# Patient Record
Sex: MALE | Race: WHITE | Employment: UNEMPLOYED | URBAN - METROPOLITAN AREA
[De-identification: names, ages, dates, MRNs, and addresses within clinical notes are randomized per-mention and may not be internally consistent; named-entity substitution may affect disease eponyms.]

---

## 2017-02-01 ENCOUNTER — OFFICE VISIT (OUTPATIENT)
Dept: INTERNAL MEDICINE CLINIC | Facility: CLINIC | Age: 27
End: 2017-02-01

## 2017-02-01 VITALS
SYSTOLIC BLOOD PRESSURE: 146 MMHG | OXYGEN SATURATION: 97 % | HEIGHT: 71 IN | DIASTOLIC BLOOD PRESSURE: 88 MMHG | HEART RATE: 108 BPM | BODY MASS INDEX: 44.1 KG/M2 | TEMPERATURE: 98 F | WEIGHT: 315 LBS | RESPIRATION RATE: 20 BRPM

## 2017-02-01 DIAGNOSIS — E66.01 MORBID OBESITY DUE TO EXCESS CALORIES (HCC): ICD-10-CM

## 2017-02-01 DIAGNOSIS — G83.21: Primary | ICD-10-CM

## 2017-02-01 DIAGNOSIS — G47.30 SLEEP-DISORDERED BREATHING: ICD-10-CM

## 2017-02-01 DIAGNOSIS — F25.0 SCHIZOAFFECTIVE DISORDER, BIPOLAR TYPE (HCC): ICD-10-CM

## 2017-02-01 DIAGNOSIS — G83.22: Primary | ICD-10-CM

## 2017-02-01 PROCEDURE — 99212 OFFICE O/P EST SF 10 MIN: CPT | Performed by: INTERNAL MEDICINE

## 2017-02-01 PROCEDURE — 99213 OFFICE O/P EST LOW 20 MIN: CPT | Performed by: INTERNAL MEDICINE

## 2017-02-01 RX ORDER — MELOXICAM 15 MG/1
TABLET ORAL
Refills: 0 | COMMUNITY
Start: 2017-01-10 | End: 2017-02-01 | Stop reason: ALTCHOICE

## 2017-02-01 NOTE — PROGRESS NOTES
Continues to c/o coughing asociated with episodes of paralysis  Cant fill his lungs up  His geodon dose was increased  Wt Readings from Last 6 Encounters:  02/01/17 : 394 lb 9.6 oz (178.989 kg)  11/04/16 : 368 lb 6.4 oz (167.105 kg)  04/14/16 : 351 lb (159

## 2017-02-06 ENCOUNTER — LAB ENCOUNTER (OUTPATIENT)
Dept: LAB | Age: 27
End: 2017-02-06
Attending: INTERNAL MEDICINE
Payer: COMMERCIAL

## 2017-02-06 DIAGNOSIS — G83.21: ICD-10-CM

## 2017-02-06 DIAGNOSIS — G47.30 SLEEP-DISORDERED BREATHING: ICD-10-CM

## 2017-02-06 DIAGNOSIS — G83.22: ICD-10-CM

## 2017-02-06 LAB
ALBUMIN SERPL BCP-MCNC: 4.2 G/DL (ref 3.5–4.8)
ALBUMIN/GLOB SERPL: 1.4 {RATIO} (ref 1–2)
ALP SERPL-CCNC: 69 U/L (ref 32–100)
ALT SERPL-CCNC: 27 U/L (ref 17–63)
ANION GAP SERPL CALC-SCNC: 11 MMOL/L (ref 0–18)
AST SERPL-CCNC: 23 U/L (ref 15–41)
BASOPHILS # BLD: 0.1 K/UL (ref 0–0.2)
BASOPHILS NFR BLD: 1 %
BILIRUB SERPL-MCNC: 0.9 MG/DL (ref 0.3–1.2)
BUN SERPL-MCNC: 6 MG/DL (ref 8–20)
BUN/CREAT SERPL: 6.7 (ref 10–20)
CALCIUM SERPL-MCNC: 9.6 MG/DL (ref 8.5–10.5)
CHLORIDE SERPL-SCNC: 100 MMOL/L (ref 95–110)
CO2 SERPL-SCNC: 27 MMOL/L (ref 22–32)
CREAT SERPL-MCNC: 0.89 MG/DL (ref 0.5–1.5)
EOSINOPHIL # BLD: 0.5 K/UL (ref 0–0.7)
EOSINOPHIL NFR BLD: 5 %
ERYTHROCYTE [DISTWIDTH] IN BLOOD BY AUTOMATED COUNT: 13.3 % (ref 11–15)
GLOBULIN PLAS-MCNC: 3.1 G/DL (ref 2.5–3.7)
GLUCOSE SERPL-MCNC: 91 MG/DL (ref 70–99)
HCT VFR BLD AUTO: 46.5 % (ref 41–52)
HGB BLD-MCNC: 15.5 G/DL (ref 13.5–17.5)
LYMPHOCYTES # BLD: 2.4 K/UL (ref 1–4)
LYMPHOCYTES NFR BLD: 26 %
MCH RBC QN AUTO: 29.8 PG (ref 27–32)
MCHC RBC AUTO-ENTMCNC: 33.3 G/DL (ref 32–37)
MCV RBC AUTO: 89.5 FL (ref 80–100)
MONOCYTES # BLD: 0.8 K/UL (ref 0–1)
MONOCYTES NFR BLD: 9 %
NEUTROPHILS # BLD AUTO: 5.4 K/UL (ref 1.8–7.7)
NEUTROPHILS NFR BLD: 60 %
OSMOLALITY UR CALC.SUM OF ELEC: 283 MOSM/KG (ref 275–295)
PLATELET # BLD AUTO: 338 K/UL (ref 140–400)
PMV BLD AUTO: 9.6 FL (ref 7.4–10.3)
POTASSIUM SERPL-SCNC: 4.2 MMOL/L (ref 3.3–5.1)
PROT SERPL-MCNC: 7.3 G/DL (ref 5.9–8.4)
RBC # BLD AUTO: 5.19 M/UL (ref 4.5–5.9)
SODIUM SERPL-SCNC: 138 MMOL/L (ref 136–144)
WBC # BLD AUTO: 9 K/UL (ref 4–11)

## 2017-02-06 PROCEDURE — 80053 COMPREHEN METABOLIC PANEL: CPT

## 2017-02-06 PROCEDURE — 36415 COLL VENOUS BLD VENIPUNCTURE: CPT

## 2017-02-06 PROCEDURE — 85025 COMPLETE CBC W/AUTO DIFF WBC: CPT

## 2017-03-03 ENCOUNTER — OFFICE VISIT (OUTPATIENT)
Dept: NEUROLOGY | Facility: CLINIC | Age: 27
End: 2017-03-03

## 2017-03-03 ENCOUNTER — APPOINTMENT (OUTPATIENT)
Dept: LAB | Facility: HOSPITAL | Age: 27
End: 2017-03-03
Attending: Other
Payer: COMMERCIAL

## 2017-03-03 VITALS
DIASTOLIC BLOOD PRESSURE: 94 MMHG | BODY MASS INDEX: 45.1 KG/M2 | WEIGHT: 315 LBS | HEART RATE: 84 BPM | SYSTOLIC BLOOD PRESSURE: 160 MMHG | HEIGHT: 70 IN

## 2017-03-03 DIAGNOSIS — R13.10 DYSPHAGIA, UNSPECIFIED TYPE: Primary | ICD-10-CM

## 2017-03-03 DIAGNOSIS — R13.10 DYSPHAGIA, UNSPECIFIED TYPE: ICD-10-CM

## 2017-03-03 LAB
CK SERPL-CCNC: 171 U/L (ref 49–397)
ERYTHROCYTE [SEDIMENTATION RATE] IN BLOOD: 7 MM/HR (ref 0–15)
TSH SERPL-ACNC: 0.94 UIU/ML (ref 0.34–5.6)

## 2017-03-03 PROCEDURE — 85652 RBC SED RATE AUTOMATED: CPT | Performed by: OTHER

## 2017-03-03 PROCEDURE — 86038 ANTINUCLEAR ANTIBODIES: CPT

## 2017-03-03 PROCEDURE — 82550 ASSAY OF CK (CPK): CPT

## 2017-03-03 PROCEDURE — 84443 ASSAY THYROID STIM HORMONE: CPT

## 2017-03-03 PROCEDURE — 83519 RIA NONANTIBODY: CPT

## 2017-03-03 PROCEDURE — 84165 PROTEIN E-PHORESIS SERUM: CPT

## 2017-03-03 PROCEDURE — 36415 COLL VENOUS BLD VENIPUNCTURE: CPT

## 2017-03-03 PROCEDURE — 99244 OFF/OP CNSLTJ NEW/EST MOD 40: CPT | Performed by: OTHER

## 2017-03-03 RX ORDER — ACETAMINOPHEN 500 MG
500 TABLET ORAL EVERY 6 HOURS PRN
COMMUNITY
End: 2017-07-19

## 2017-03-03 RX ORDER — ZOLPIDEM TARTRATE 10 MG/1
TABLET ORAL
Refills: 0 | COMMUNITY
Start: 2017-02-09 | End: 2017-07-19

## 2017-03-03 RX ORDER — MELOXICAM 15 MG/1
TABLET ORAL
Refills: 0 | COMMUNITY
Start: 2017-02-07 | End: 2017-03-03

## 2017-03-03 NOTE — PROGRESS NOTES
Hussein Thomas : 8/3/1990     HPI:   Patient presents with:  Neurologic Problem: New patient, referred by Dr. Laurent Harley. Patient reports that last summer he started having episodes of trouble swallowing liquids,and choking.  Also lower legs become very visit.    Past Medical History   Diagnosis Date   • Pleurisy twice in 2013     left lung   • Schizo affective schizophrenia ([facility]    • Anxiety state, unspecified      severre anxiety   • Esophageal reflux    • Suicide and self-inflicted poisoning by drug or schizophrenia  NEURO: As in HPI    EXAM:     Vitals:  /94 mmHg  Pulse 84  Ht 70\"  Wt 398 lb  BMI 57.11 kg/m2 repeat blood pressure is 140/82 in both arms. General appearance: In no distress. His neck is supple without Lhermitte sign.   CV: No  Evid sed rate, GABRIELLA, CPK, TSH, and acetylcholine receptor antibody to rule out muscular or neuromuscular cause. If the blood work is negative, I will move forward with a video swallowing study to try to determine the cause of the dysphagia.   EMG with repetitive

## 2017-03-07 LAB
ALBUMIN SERPL ELPH-MCNC: 4.85 G/DL (ref 3.8–5.8)
ALBUMIN/GLOB SERPL: 1.83 {RATIO} (ref 1–2)
ALPHA1 GLOB SERPL ELPH-MCNC: 0.2 G/DL (ref 0.1–0.3)
ALPHA2 GLOB SERPL ELPH-MCNC: 0.89 G/DL (ref 0.6–1)
ANA SER QL: NEGATIVE
B-GLOBULIN SERPL ELPH-MCNC: 0.94 G/DL (ref 0.7–1.3)
GAMMA GLOB SERPL ELPH-MCNC: 0.62 G/DL (ref 0.5–1.7)
TOTAL PROTEIN (SPECIAL TESTING): 7.5 G/DL (ref 6.5–9.1)

## 2017-03-08 LAB — ACETYLCHOLINE BINDING AB: 0 NMOL/L

## 2017-03-10 ENCOUNTER — TELEPHONE (OUTPATIENT)
Dept: NEUROLOGY | Facility: CLINIC | Age: 27
End: 2017-03-10

## 2017-03-10 DIAGNOSIS — R13.10 DYSPHAGIA, UNSPECIFIED TYPE: Primary | ICD-10-CM

## 2017-03-30 ENCOUNTER — HOSPITAL ENCOUNTER (OUTPATIENT)
Dept: GENERAL RADIOLOGY | Facility: HOSPITAL | Age: 27
Discharge: HOME | End: 2017-03-30
Attending: Other
Payer: COMMERCIAL

## 2017-03-30 DIAGNOSIS — R13.10 DYSPHAGIA, UNSPECIFIED TYPE: ICD-10-CM

## 2017-03-30 PROCEDURE — 74230 X-RAY XM SWLNG FUNCJ C+: CPT

## 2017-03-30 PROCEDURE — 92611 MOTION FLUOROSCOPY/SWALLOW: CPT

## 2017-03-30 NOTE — PROGRESS NOTES
ADULT VIDEOFLUOROSCOPIC SWALLOWING STUDY    Referring Physician: Dr. Vernell Delong    1.  Dysphagia, unspecified type R13.10 XR VIDEO SWALLOW (CPT=74230)     XR VIDEO SWALLOW (GFN=46660)      Date of Service: 3/30/2017   Radiologist: Dr. Ben Barrera disorder [facility])    • DVT (deep venous thrombosis) (HCC)    • Transaminitis    • Hypertension    • Pneumonia    • Hypokalemia        Imaging results: CXR 11/7/16:  Negative chest results.     ASSESSMENT   DYSPHAGIA ASSESSMENT  Test completed in conjunction wi swallow.     Esophageal phase:   Adequate flow of bolus through upper esophagus     Overall Impression: Pt presents with adequate oral phase of swallow and minimal oral dysphagia secondary to delayed transit times on thin liquids causing flash laryngeal pen SLP

## 2017-04-04 ENCOUNTER — TELEPHONE (OUTPATIENT)
Dept: NEUROLOGY | Facility: CLINIC | Age: 27
End: 2017-04-04

## 2017-04-04 NOTE — TELEPHONE ENCOUNTER
Dr. Escobedo Loveless called, who has been seeing him for a long time. Has history of bipolar, with previous delusions and somatic symptoms.

## 2017-04-07 ENCOUNTER — TELEPHONE (OUTPATIENT)
Dept: NEUROLOGY | Facility: CLINIC | Age: 27
End: 2017-04-07

## 2017-05-22 ENCOUNTER — OFFICE VISIT (OUTPATIENT)
Dept: PULMONOLOGY | Facility: CLINIC | Age: 27
End: 2017-05-22

## 2017-05-22 VITALS
SYSTOLIC BLOOD PRESSURE: 140 MMHG | RESPIRATION RATE: 18 BRPM | DIASTOLIC BLOOD PRESSURE: 90 MMHG | HEART RATE: 94 BPM | WEIGHT: 315 LBS | HEIGHT: 71 IN | OXYGEN SATURATION: 96 % | BODY MASS INDEX: 44.1 KG/M2

## 2017-05-22 DIAGNOSIS — R05.9 COUGHING: Primary | ICD-10-CM

## 2017-05-22 PROCEDURE — 99244 OFF/OP CNSLTJ NEW/EST MOD 40: CPT | Performed by: INTERNAL MEDICINE

## 2017-05-22 PROCEDURE — 99212 OFFICE O/P EST SF 10 MIN: CPT | Performed by: INTERNAL MEDICINE

## 2017-05-22 NOTE — PROGRESS NOTES
Dear Aroldo Contreras:           As you know, Florentin Duffy is a 78-year-old male who I am meeting now for the first time to evaluate sleep issues and cough. HISTORY OF PRESENT ILLNESS: The patient has a history of obesity with body mass index of 56.8.   He also has sc normal. Bladder function normal. No depression. No thyroid disease. No rash. Muscles and joints unremarkable. Weight gain 30 pounds in the last year.     PHYSICAL EXAMINATION: Vital signs normal. HEENT examination is unremarkable with pupils equal round an

## 2017-06-15 ENCOUNTER — TELEPHONE (OUTPATIENT)
Dept: GASTROENTEROLOGY | Facility: CLINIC | Age: 27
End: 2017-06-15

## 2017-06-19 NOTE — TELEPHONE ENCOUNTER
Pts mother calling back and an appt. Has been scheduled for Mon. 6/26 with Susan Golden for consult.

## 2017-06-26 ENCOUNTER — OFFICE VISIT (OUTPATIENT)
Dept: GASTROENTEROLOGY | Facility: CLINIC | Age: 27
End: 2017-06-26

## 2017-06-26 ENCOUNTER — LAB ENCOUNTER (OUTPATIENT)
Dept: LAB | Facility: HOSPITAL | Age: 27
End: 2017-06-26
Attending: NURSE PRACTITIONER
Payer: COMMERCIAL

## 2017-06-26 VITALS
HEART RATE: 100 BPM | BODY MASS INDEX: 44.1 KG/M2 | WEIGHT: 315 LBS | HEIGHT: 71 IN | SYSTOLIC BLOOD PRESSURE: 160 MMHG | DIASTOLIC BLOOD PRESSURE: 100 MMHG

## 2017-06-26 DIAGNOSIS — R03.0 ELEVATED BP WITHOUT DIAGNOSIS OF HYPERTENSION: ICD-10-CM

## 2017-06-26 DIAGNOSIS — Z86.010 HISTORY OF COLON POLYPS: ICD-10-CM

## 2017-06-26 DIAGNOSIS — R07.89 CHEST WALL DISCOMFORT: ICD-10-CM

## 2017-06-26 DIAGNOSIS — R19.4 CHANGE IN BOWEL HABITS: ICD-10-CM

## 2017-06-26 DIAGNOSIS — R10.84 GENERALIZED ABDOMINAL PAIN: ICD-10-CM

## 2017-06-26 DIAGNOSIS — R10.84 GENERALIZED ABDOMINAL PAIN: Primary | ICD-10-CM

## 2017-06-26 PROCEDURE — 99212 OFFICE O/P EST SF 10 MIN: CPT | Performed by: NURSE PRACTITIONER

## 2017-06-26 PROCEDURE — 85025 COMPLETE CBC W/AUTO DIFF WBC: CPT

## 2017-06-26 PROCEDURE — 86140 C-REACTIVE PROTEIN: CPT

## 2017-06-26 PROCEDURE — 80048 BASIC METABOLIC PNL TOTAL CA: CPT

## 2017-06-26 PROCEDURE — 85652 RBC SED RATE AUTOMATED: CPT

## 2017-06-26 PROCEDURE — 36415 COLL VENOUS BLD VENIPUNCTURE: CPT

## 2017-06-26 PROCEDURE — 99244 OFF/OP CNSLTJ NEW/EST MOD 40: CPT | Performed by: NURSE PRACTITIONER

## 2017-06-26 PROCEDURE — 80076 HEPATIC FUNCTION PANEL: CPT

## 2017-06-26 NOTE — PATIENT INSTRUCTIONS
1. Complete labs and stool testing  2. Complete CT abdomen/pelvis  3. I will call you with the results  4. Please find out where you had your colonoscopy done and see if you can get the records including the colonoscopy report and pathology reports.   Anika

## 2017-06-26 NOTE — H&P
[address] [facility] [address] Gastroenterology                                                                                                  Clinic History and Physical     Pa resolved. Appetite has decreased due to abdominal discomfort following most meals. Denies unexpected weight loss. Denies shortness of breath. The patient has also noticed \"hardening of the fat on the chest and abdomen. \"  He states when he googled t • Hypertension    • Hypokalemia    • Overdose 01/30/2016    haldol overdose/poisoning   • Pleurisy twice in 2013    left lung   • Pneumonia    • Schizo affective schizophrenia ([facility]    • Suicide and self-inflicted poisoning by drug or medicinal substance Cira  Lithium                     Comment:Patient overdosed on Lithium Summer of 2013 and             was told to never have Lithium again due to             possible renal problems later on.     ROS:   CONSTITUTIONAL:  negative for fevers, rigor evaluation of abdominal pain and change in bowel habits.       No anemia noted on lab work dated 2/6/2017.    1.  Generalized abdominal pain/change in bowel habits/nausea and vomiting/decreased appetite: Patient's acute discomfort along with his change in b Following this, may recommend repeat colonoscopy with MAC in light of findings.       Recommend:  See above      Orders This Visit:    Orders Placed This Encounter      CBC W Differential W Platelet      Hepatic Function Panel (7)      Basic Metabolic Panel

## 2017-06-28 ENCOUNTER — PATIENT MESSAGE (OUTPATIENT)
Dept: GASTROENTEROLOGY | Facility: CLINIC | Age: 27
End: 2017-06-28

## 2017-06-28 ENCOUNTER — HOSPITAL ENCOUNTER (OUTPATIENT)
Dept: CT IMAGING | Facility: HOSPITAL | Age: 27
Discharge: HOME | End: 2017-06-28
Attending: NURSE PRACTITIONER
Payer: COMMERCIAL

## 2017-06-28 DIAGNOSIS — R10.84 GENERALIZED ABDOMINAL PAIN: ICD-10-CM

## 2017-06-28 DIAGNOSIS — R10.84 GENERALIZED ABDOMINAL PAIN: Primary | ICD-10-CM

## 2017-06-28 DIAGNOSIS — R19.4 CHANGE IN BOWEL HABITS: ICD-10-CM

## 2017-06-28 PROCEDURE — 74177 CT ABD & PELVIS W/CONTRAST: CPT | Performed by: NURSE PRACTITIONER

## 2017-06-29 ENCOUNTER — LAB ENCOUNTER (OUTPATIENT)
Dept: LAB | Facility: HOSPITAL | Age: 27
End: 2017-06-29
Attending: NURSE PRACTITIONER
Payer: COMMERCIAL

## 2017-06-29 DIAGNOSIS — R19.4 CHANGE IN BOWEL HABITS: ICD-10-CM

## 2017-06-29 LAB
C DIFF TOX B STL QL: NEGATIVE
CRYPTOSP AG STL QL IA: NEGATIVE
G LAMBLIA AG STL QL IA: NEGATIVE

## 2017-06-29 PROCEDURE — 87427 SHIGA-LIKE TOXIN AG IA: CPT

## 2017-06-29 PROCEDURE — 87493 C DIFF AMPLIFIED PROBE: CPT

## 2017-06-29 PROCEDURE — 87272 CRYPTOSPORIDIUM AG IF: CPT

## 2017-06-29 PROCEDURE — 87045 FECES CULTURE AEROBIC BACT: CPT

## 2017-06-29 PROCEDURE — 87046 STOOL CULTR AEROBIC BACT EA: CPT

## 2017-06-29 PROCEDURE — 87338 HPYLORI STOOL AG IA: CPT

## 2017-06-29 PROCEDURE — 83993 ASSAY FOR CALPROTECTIN FECAL: CPT

## 2017-06-29 PROCEDURE — 87329 GIARDIA AG IA: CPT

## 2017-06-29 NOTE — TELEPHONE ENCOUNTER
Records by Dr Coretta Call from 2013 in [facility].  Printed and placed on Kylie's desk to review

## 2017-06-29 NOTE — TELEPHONE ENCOUNTER
From: Mikie Russell  To: THERESA Velez  Sent: 6/28/2017 6:41 PM CDT  Subject: Other    My colonoscopy was done at [place] the results are in mychart so you should have access to them.

## 2017-06-29 NOTE — TELEPHONE ENCOUNTER
Noted. Will await stool tests results and will give pts results/recommendations for everything at one time.

## 2017-06-30 LAB — HELICOBACTER PYLORI AG, FECAL: POSITIVE

## 2017-07-01 LAB — CALPROTECTIN, FECAL: <16 UG/G

## 2017-07-03 ENCOUNTER — TELEPHONE (OUTPATIENT)
Dept: GASTROENTEROLOGY | Facility: CLINIC | Age: 27
End: 2017-07-03

## 2017-07-03 DIAGNOSIS — Z86.010 HISTORY OF ADENOMATOUS POLYP OF COLON: ICD-10-CM

## 2017-07-03 DIAGNOSIS — A04.8 H. PYLORI INFECTION: Primary | ICD-10-CM

## 2017-07-03 RX ORDER — PANTOPRAZOLE SODIUM 40 MG/1
40 TABLET, DELAYED RELEASE ORAL 2 TIMES DAILY
Qty: 28 TABLET | Refills: 0 | Status: SHIPPED | OUTPATIENT
Start: 2017-07-03 | End: 2017-07-03 | Stop reason: ALTCHOICE

## 2017-07-03 RX ORDER — OMEPRAZOLE 20 MG/1
20 CAPSULE, DELAYED RELEASE ORAL 2 TIMES DAILY
Qty: 20 CAPSULE | Refills: 0 | Status: SHIPPED | OUTPATIENT
Start: 2017-07-03 | End: 2017-07-03 | Stop reason: ALTCHOICE

## 2017-07-03 RX ORDER — CLARITHROMYCIN 500 MG/1
500 TABLET, COATED ORAL 2 TIMES DAILY
Qty: 28 TABLET | Refills: 0 | Status: SHIPPED | OUTPATIENT
Start: 2017-07-03 | End: 2017-07-03 | Stop reason: ALTCHOICE

## 2017-07-03 RX ORDER — PANTOPRAZOLE SODIUM 40 MG/1
40 TABLET, DELAYED RELEASE ORAL 2 TIMES DAILY
Qty: 28 TABLET | Refills: 0 | Status: SHIPPED | OUTPATIENT
Start: 2017-07-03 | End: 2017-07-17

## 2017-07-03 RX ORDER — AMOXICILLIN 500 MG/1
1000 TABLET, FILM COATED ORAL 2 TIMES DAILY
Qty: 56 TABLET | Refills: 0 | Status: SHIPPED | OUTPATIENT
Start: 2017-07-03 | End: 2017-07-03 | Stop reason: ALTCHOICE

## 2017-07-03 RX ORDER — TETRACYCLINE HYDROCHLORIDE 500 MG/1
500 CAPSULE ORAL 4 TIMES DAILY
Qty: 56 CAPSULE | Refills: 0 | Status: SHIPPED | OUTPATIENT
Start: 2017-07-03 | End: 2017-07-03 | Stop reason: ALTCHOICE

## 2017-07-03 RX ORDER — METRONIDAZOLE 250 MG/1
250 TABLET ORAL 4 TIMES DAILY
Qty: 56 TABLET | Refills: 0 | Status: SHIPPED | OUTPATIENT
Start: 2017-07-03 | End: 2017-07-17

## 2017-07-03 RX ORDER — DOXYCYCLINE HYCLATE 100 MG
100 TABLET ORAL 2 TIMES DAILY
Qty: 28 TABLET | Refills: 0 | Status: SHIPPED | OUTPATIENT
Start: 2017-07-03 | End: 2017-07-17

## 2017-07-03 NOTE — TELEPHONE ENCOUNTER
Karlie CARDENAS, please see message below. Thank you    Spoke to [employer] from pharmacy and informed of [employer]. States Pylera is covered by insurance but theres is still an additional cost of $200 for the pt d/t rx being so expensive. Please advise.

## 2017-07-03 NOTE — TELEPHONE ENCOUNTER
Nursing: I canceled the Pylera and Omeprazole in Epic and resent the Quad therapy as individual Rx. Please let the pharmacy and pt know. Also, please make sure the pt knows he cannot drink ETOH due to abx.

## 2017-07-03 NOTE — TELEPHONE ENCOUNTER
----- Message from THERESA Medina sent at 7/3/2017  9:02 AM CDT -----  Nursing: I reviewed patient's labs via my chart. H. pylori was positive and I started triple therapy with repeat stool testing in 6-8 weeks following treatment.   C-reactive protei

## 2017-07-03 NOTE — TELEPHONE ENCOUNTER
Sent to [address] re below. Meds/allergies reviewed. Thanks. Reviewed results with pt and gave number for Danielle Be [phone] genetic counselor at [facility]. Has PPO. Pt will  bowel prep and await Cinthia's call.

## 2017-07-03 NOTE — TELEPHONE ENCOUNTER
Spoke to pharmacy and informed them of Isaeblla APN message and recommendations. Verbalized understanding and had no further questions or requests at this time. Spoke to pt and informed per Isabella APN he cannot drink ETOH due to abx.  Pt verbalized unders

## 2017-07-03 NOTE — TELEPHONE ENCOUNTER
I spoke w/ pharmacy and called in doxycycline 100 mg BID in place of tetracycline. Pharmacy will notify me of any further issues.

## 2017-07-03 NOTE — TELEPHONE ENCOUNTER
Nursing: I have sent in quad therapy in place of triple therapy as there is not a substitute for Biaxin. Please have the pharmacy cancel the triple therapy and let me know if the Pylera is not covered by insurance.  Will have send medications separately if

## 2017-07-03 NOTE — TELEPHONE ENCOUNTER
THERESA Mtz please see message below. Thank you    Spoke to David from pharmacy states there is an Interaction with Geodon and Biaxin states theres QT prolongation and wanted to make sure ok to dispense to pt.  Informed message would be sent to [facility]

## 2017-07-03 NOTE — TELEPHONE ENCOUNTER
Pharmacy called re: RX: Tetracycline. Pharmacy indicates that pt does not want this RX it cost over $200. Pharmacy asks if RX should go back to: RX: Pyloea? Pls call at: [phone], thanks.     Current Outpatient Prescriptions:   •  Tetracycline HCl 500

## 2017-07-16 ENCOUNTER — PATIENT MESSAGE (OUTPATIENT)
Dept: GASTROENTEROLOGY | Facility: CLINIC | Age: 27
End: 2017-07-16

## 2017-07-17 NOTE — TELEPHONE ENCOUNTER
From: Delon Lynch  To: THERESA Malik  Sent: 7/16/2017 7:33 PM CDT  Subject: Other    Isabella,    I am finishing up the anti-biotics but I thought I should let you know I have a pretty serious feeling in both my legs below the knee.  It feels lik

## 2017-07-18 ENCOUNTER — TELEPHONE (OUTPATIENT)
Dept: INTERNAL MEDICINE CLINIC | Facility: CLINIC | Age: 27
End: 2017-07-18

## 2017-07-18 NOTE — TELEPHONE ENCOUNTER
Spoke to patient about his symptoms. After an extensive conversation I advised the patient to go to the emergency room and that if there are financial issues to at least go to immediate care to be assessed and have vitals taken.       Actions Requested: FY

## 2017-07-18 NOTE — TELEPHONE ENCOUNTER
Pt scheduled an appt via [employer] with Dr. Marlin Love for 7/19 with these symptoms:    Visit Type: EE NEW PATIENT OS (6705)      7/19/2017   11:00 AM  40 mins. Domingo Solis MD         [facility]      Patient Comments:   Tingling/Vibrating Feeling i

## 2017-07-19 ENCOUNTER — OFFICE VISIT (OUTPATIENT)
Dept: INTERNAL MEDICINE CLINIC | Facility: CLINIC | Age: 27
End: 2017-07-19

## 2017-07-19 VITALS
DIASTOLIC BLOOD PRESSURE: 96 MMHG | BODY MASS INDEX: 44.1 KG/M2 | HEART RATE: 88 BPM | HEIGHT: 71 IN | WEIGHT: 315 LBS | SYSTOLIC BLOOD PRESSURE: 173 MMHG

## 2017-07-19 DIAGNOSIS — M54.50 CHRONIC BILATERAL LOW BACK PAIN WITHOUT SCIATICA: ICD-10-CM

## 2017-07-19 DIAGNOSIS — K21.9 GASTROESOPHAGEAL REFLUX DISEASE, ESOPHAGITIS PRESENCE NOT SPECIFIED: ICD-10-CM

## 2017-07-19 DIAGNOSIS — F17.200 TOBACCO DEPENDENCE: ICD-10-CM

## 2017-07-19 DIAGNOSIS — G89.29 CHRONIC BILATERAL LOW BACK PAIN WITHOUT SCIATICA: ICD-10-CM

## 2017-07-19 DIAGNOSIS — F23 SCHIZOPHRENIA, ACUTE (HCC): ICD-10-CM

## 2017-07-19 DIAGNOSIS — R20.2 TINGLING IN EXTREMITIES: ICD-10-CM

## 2017-07-19 DIAGNOSIS — I10 ESSENTIAL HYPERTENSION: Primary | ICD-10-CM

## 2017-07-19 PROCEDURE — 99212 OFFICE O/P EST SF 10 MIN: CPT | Performed by: INTERNAL MEDICINE

## 2017-07-19 PROCEDURE — 99214 OFFICE O/P EST MOD 30 MIN: CPT | Performed by: INTERNAL MEDICINE

## 2017-07-19 RX ORDER — ZIPRASIDONE HYDROCHLORIDE 80 MG/1
80 CAPSULE ORAL 2 TIMES DAILY WITH MEALS
COMMUNITY

## 2017-07-19 RX ORDER — NIFEDIPINE 30 MG/1
30 TABLET, FILM COATED, EXTENDED RELEASE ORAL DAILY
Qty: 30 TABLET | Refills: 1 | Status: SHIPPED | OUTPATIENT
Start: 2017-07-19 | End: 2017-08-11

## 2017-07-19 RX ORDER — BUSPIRONE HYDROCHLORIDE 5 MG/1
10 TABLET ORAL 2 TIMES DAILY
COMMUNITY
End: 2017-10-16

## 2017-07-19 RX ORDER — PROPRANOLOL HYDROCHLORIDE 10 MG/1
10 TABLET ORAL AS NEEDED
COMMUNITY
End: 2017-08-11

## 2017-07-19 RX ORDER — CLONAZEPAM 1 MG/1
1 TABLET ORAL NIGHTLY PRN
COMMUNITY
End: 2017-10-16

## 2017-07-19 NOTE — PROGRESS NOTES
Melania Vicente is a 23year old male.   Patient presents with:  Tingling: bilateral legs       HPI:   Pt is a 22 yo man comes as a new pt   C/c nubmness and tingling in t he legs for a few mns   Feels like gravity is pulling his legs down and tingling an tobacco: Never Used                      Alcohol use: Yes           1.2 oz/week     Cans of beer: 2 per week     Comment: only on holidays.        REVIEW OF SYSTEMS:   GENERAL HEALTH: No fevers, chills, sweats,+ fatigue  VISION: No recent vision problems, b extremities   ?uncertain etiology - ?meds vs dehydration vs ?scitica -- advised hydration as he states he is always thirsty   Gastroesophageal reflux disease, esophagitis presence not specified    Schizophrenia, acute ([facility]   Pt sees dr Kaylah Moreno in [place]

## 2017-07-19 NOTE — PATIENT INSTRUCTIONS
Low-Salt Diet  This diet removes foods that are high in salt. It also limits the amount of salt you use when cooking. It is most often used for people with high blood pressure, edema (fluid retention), and kidney, liver, or heart disease.   Table salt con Avoid: Flavored coffees, electrolyte replacement drinks, sports drinks  Meats  Ok: All fresh meat, fish, poultry, low-salt tuna, eggs, egg substitute  Avoid: Smoked, pickled, brine-cured, or salted meats and fish.  This includes naranjo, chipped beef, corned

## 2017-07-21 ENCOUNTER — HOSPITAL ENCOUNTER (OUTPATIENT)
Dept: GENERAL RADIOLOGY | Age: 27
Discharge: HOME | End: 2017-07-21
Attending: INTERNAL MEDICINE
Payer: COMMERCIAL

## 2017-07-21 DIAGNOSIS — G89.29 CHRONIC BILATERAL LOW BACK PAIN WITHOUT SCIATICA: ICD-10-CM

## 2017-07-21 DIAGNOSIS — M54.50 CHRONIC BILATERAL LOW BACK PAIN WITHOUT SCIATICA: ICD-10-CM

## 2017-07-21 PROCEDURE — 72110 X-RAY EXAM L-2 SPINE 4/>VWS: CPT | Performed by: INTERNAL MEDICINE

## 2017-08-10 ENCOUNTER — TELEPHONE (OUTPATIENT)
Dept: GASTROENTEROLOGY | Facility: CLINIC | Age: 27
End: 2017-08-10

## 2017-08-10 NOTE — TELEPHONE ENCOUNTER
I called and left a voicemail message for pt call back regarding pending labs    I sent a message via [employer]

## 2017-08-11 ENCOUNTER — OFFICE VISIT (OUTPATIENT)
Dept: INTERNAL MEDICINE CLINIC | Facility: CLINIC | Age: 27
End: 2017-08-11

## 2017-08-11 VITALS
DIASTOLIC BLOOD PRESSURE: 105 MMHG | WEIGHT: 315 LBS | HEIGHT: 71 IN | HEART RATE: 94 BPM | SYSTOLIC BLOOD PRESSURE: 151 MMHG | TEMPERATURE: 99 F | BODY MASS INDEX: 44.1 KG/M2

## 2017-08-11 DIAGNOSIS — F17.200 TOBACCO DEPENDENCE: ICD-10-CM

## 2017-08-11 DIAGNOSIS — K21.9 GASTROESOPHAGEAL REFLUX DISEASE WITHOUT ESOPHAGITIS: ICD-10-CM

## 2017-08-11 DIAGNOSIS — I10 ESSENTIAL HYPERTENSION: Primary | ICD-10-CM

## 2017-08-11 DIAGNOSIS — E24.9 CUSHING'S SYNDROME (HCC): ICD-10-CM

## 2017-08-11 DIAGNOSIS — F25.0 SCHIZOAFFECTIVE DISORDER, BIPOLAR TYPE (HCC): ICD-10-CM

## 2017-08-11 PROCEDURE — 99214 OFFICE O/P EST MOD 30 MIN: CPT | Performed by: INTERNAL MEDICINE

## 2017-08-11 PROCEDURE — 99212 OFFICE O/P EST SF 10 MIN: CPT | Performed by: INTERNAL MEDICINE

## 2017-08-11 RX ORDER — NIFEDIPINE 60 MG/1
TABLET, FILM COATED, EXTENDED RELEASE ORAL
Qty: 30 TABLET | Refills: 1 | Status: SHIPPED | OUTPATIENT
Start: 2017-08-11 | End: 2017-10-02

## 2017-08-11 NOTE — PATIENT INSTRUCTIONS
Understanding E-Cigarettes  E-cigarettes have become a popular form of smoking. People may use these devices in place of regular cigarettes. Or they may use them to try to quit smoking altogether. What are e-cigarettes?   E-cigarettes are devices that al Experts worry that a smoker who uses e-cigarettes may not try other, proven ways to quit. A number of quit-smoking tools are available that have been approved by the FDA. If you are trying to quit smoking, see your healthcare provider for help.   Are they s © 3322-8293 [address], [address] [place]. All rights reserved. This information is not intended as a substitute for professional medical care. Always follow your healthcare professional's instructions.

## 2017-08-11 NOTE — PROGRESS NOTES
Kell Morales is a 33year old male. Patient presents with: Follow - Up: on last office visit 7/19 for hypertension.         HPI:   Pt is a 32 yo man comes for f/u for htn   C/c htn and   Has numbness and tingling in the legs and lump on back states i 1.2 oz/week     Cans of beer: 2 per week     Comment: only on holidays.        REVIEW OF SYSTEMS:   GENERAL HEALTH: No fevers, chills, sweats, fatigue  VISION: No recent vision problems, blurry vision or double vision  HEENT: No decreased hearing ear pain orders  -     NIFEdipine ER 60 MG Oral Tablet 24 Hr; 60mg po qd    Back pain - reviewed xray 7/17   H/o dvt right arm s/p xarelto   Labs 6/17 and 2/17 reviewed   The patient indicates understanding of these issues and agrees to the plan.   Return in about 4

## 2017-10-01 RX ORDER — NIFEDIPINE 60 MG/1
TABLET, FILM COATED, EXTENDED RELEASE ORAL
Qty: 30 TABLET | Refills: 0 | Status: CANCELLED | OUTPATIENT
Start: 2017-10-01

## 2017-10-02 ENCOUNTER — PATIENT MESSAGE (OUTPATIENT)
Dept: INTERNAL MEDICINE CLINIC | Facility: CLINIC | Age: 27
End: 2017-10-02

## 2017-10-02 ENCOUNTER — PATIENT MESSAGE (OUTPATIENT)
Dept: GASTROENTEROLOGY | Facility: CLINIC | Age: 27
End: 2017-10-02

## 2017-10-02 RX ORDER — NIFEDIPINE 60 MG/1
TABLET, FILM COATED, EXTENDED RELEASE ORAL
Qty: 90 TABLET | Refills: 1 | Status: SHIPPED | OUTPATIENT
Start: 2017-10-02 | End: 2018-01-25

## 2017-10-02 RX ORDER — NIFEDIPINE 60 MG/1
TABLET, FILM COATED, EXTENDED RELEASE ORAL
Qty: 30 TABLET | Refills: 1 | Status: CANCELLED
Start: 2017-10-02

## 2017-10-02 NOTE — TELEPHONE ENCOUNTER
From: Vj Ahn  Sent: 10/2/2017 6:16 AM CDT  Subject: Medication Renewal Request    Juanita Kamar.  Rickey Saucedo would like a refill of the following medications:     NIFEdipine ER 60 MG Oral Tablet 24 Hr Shahid Russell MD]    Preferred pharmacy: [facility]

## 2017-10-02 NOTE — TELEPHONE ENCOUNTER
I cannot see when lipase was ordered. Did you still want pt to get this done?   I do not see from office visit

## 2017-10-02 NOTE — TELEPHONE ENCOUNTER
From: Meredith Otoole  To: Adan Arndt MD  Sent: 10/2/2017 6:18 AM CDT  Subject: Non-Urgent Jose David Luciano Every, I haven't been able to schedule a follow up or the endochronologist because of issues with switching to cobra and starti

## 2017-10-02 NOTE — TELEPHONE ENCOUNTER
From: Frances Ochoa  To:  THERESA Thomas  Sent: 10/2/2017 6:19 AM CDT  Subject: Non-Urgent Medical Question    I got a message saying I still needed a liphase blood test and I was just wondering if this test was ever ordered because I have been to

## 2017-10-02 NOTE — TELEPHONE ENCOUNTER
From: Olga Due  Sent: 10/2/2017 6:16 AM CDT  Subject: Medication Renewal Request    Dameon Carbajal.  Silvio Vasquez would like a refill of the following medications:     NIFEdipine ER 60 MG Oral Tablet 24 Hr Herrera Lacey MD]    Preferred pharmacy: [facility]

## 2017-10-02 NOTE — TELEPHONE ENCOUNTER
Please see pt MyChart message / advise.   Thank you            Hypertensive Medications  Protocol Criteria:  · Appointment scheduled in the past 6 months or in the next 3 months  · BMP or CMP in the past 12 months  · Creatinine result < 2  Recent Outpatient

## 2017-10-09 ENCOUNTER — APPOINTMENT (OUTPATIENT)
Dept: LAB | Age: 27
End: 2017-10-09
Attending: INTERNAL MEDICINE
Payer: COMMERCIAL

## 2017-10-09 DIAGNOSIS — E24.9 CUSHING'S SYNDROME (HCC): ICD-10-CM

## 2017-10-09 DIAGNOSIS — R10.84 GENERALIZED ABDOMINAL PAIN: ICD-10-CM

## 2017-10-09 PROCEDURE — 83690 ASSAY OF LIPASE: CPT

## 2017-10-09 PROCEDURE — 36415 COLL VENOUS BLD VENIPUNCTURE: CPT

## 2017-10-09 PROCEDURE — 82607 VITAMIN B-12: CPT

## 2017-10-09 PROCEDURE — 82746 ASSAY OF FOLIC ACID SERUM: CPT

## 2017-10-10 ENCOUNTER — TELEPHONE (OUTPATIENT)
Dept: GASTROENTEROLOGY | Facility: CLINIC | Age: 27
End: 2017-10-10

## 2017-10-10 DIAGNOSIS — Z86.010 HISTORY OF ADENOMATOUS POLYP OF COLON: ICD-10-CM

## 2017-10-10 DIAGNOSIS — R19.4 ALTERED BOWEL HABITS: Primary | ICD-10-CM

## 2017-10-11 NOTE — TELEPHONE ENCOUNTER
No call was made to pt.  See result notes sent and viewed by pt in [facility]    Patient Result Comments     Viewed by Amy Joyner on 10/10/2017  1:21 PM   Written by THERESA Melvin on 10/10/2017 12:29 PM   Mukesh Limon,     Your lipase results came mellisa

## 2017-10-11 NOTE — TELEPHONE ENCOUNTER
Pt states his symptoms are worse now than in June    Pt had no insurance for a little bit but now has new insurance     Hasnt had a solid bowel in [place]. \"    Loose for awhile but now is liquid with an orange-felipe, oily looking texture.   States he is onl

## 2017-10-11 NOTE — TELEPHONE ENCOUNTER
Sent to [facility] --please see 7/3/17 phone encounter with below colon orders--See below info re adding egd, etc. Meds/allergies were already reviewed. I left message for pt on both phones so I could go over instructions re labs, etc below.   It

## 2017-10-11 NOTE — TELEPHONE ENCOUNTER
Nursing: Per my previous result notes, the patient was to be scheduled for colonoscopy with Dr. Ally Regalado due to his previous history of colon polyps. See result note for my orders. Suprep was already sent to pharmacy.  I also provided a genetics referral at th

## 2017-10-12 NOTE — TELEPHONE ENCOUNTER
Noted and agreed with nursing triage advice provided. Would advise patient to complete workup as previously discussed. He is more than welcome to follow-up with [facility] for additional evaluation. May send records as needed.

## 2017-10-12 NOTE — TELEPHONE ENCOUNTER
[facility], re your latest message below: Pt contacted and all reviewed. Please note that a voicemail was left for pt in July to schedule and he never followed up. I have forwarded to [facility] to schedule--COLONOSCOPY ONLY.   Pt states he is not having difficult

## 2017-10-13 NOTE — TELEPHONE ENCOUNTER
Scheduled for:  Colonoscopy [address]  Provider Name: Dr. Gilson Chang  Date:  12/22/17  Location:  [facility]  Sedation:  MAC  Time:  8514 (pt is aware to arrive at [address] [place] [address])   Prep:  Suprep, mailed 10/13/17 with codes  Meds/Allergies Reconciled?:  Physician reviewed   Diagnos

## 2017-10-16 ENCOUNTER — OFFICE VISIT (OUTPATIENT)
Dept: INTERNAL MEDICINE CLINIC | Facility: CLINIC | Age: 27
End: 2017-10-16

## 2017-10-16 VITALS
SYSTOLIC BLOOD PRESSURE: 140 MMHG | WEIGHT: 315 LBS | DIASTOLIC BLOOD PRESSURE: 91 MMHG | TEMPERATURE: 98 F | HEIGHT: 71 IN | BODY MASS INDEX: 44.1 KG/M2 | HEART RATE: 103 BPM

## 2017-10-16 DIAGNOSIS — I10 ESSENTIAL HYPERTENSION: Primary | ICD-10-CM

## 2017-10-16 DIAGNOSIS — R05.9 COUGHING: ICD-10-CM

## 2017-10-16 PROCEDURE — 99214 OFFICE O/P EST MOD 30 MIN: CPT | Performed by: INTERNAL MEDICINE

## 2017-10-16 PROCEDURE — 99212 OFFICE O/P EST SF 10 MIN: CPT | Performed by: INTERNAL MEDICINE

## 2017-10-16 RX ORDER — BUSPIRONE HYDROCHLORIDE 10 MG/1
TABLET ORAL
Refills: 0 | COMMUNITY
Start: 2017-10-07

## 2017-10-16 RX ORDER — CHLORHEXIDINE GLUCONATE 0.12 MG/ML
RINSE ORAL
Refills: 0 | COMMUNITY
Start: 2017-10-06 | End: 2018-01-25 | Stop reason: ALTCHOICE

## 2017-10-16 RX ORDER — AMOXICILLIN 500 MG/1
CAPSULE ORAL
Refills: 0 | COMMUNITY
Start: 2017-10-09 | End: 2018-01-25 | Stop reason: ALTCHOICE

## 2017-10-16 RX ORDER — ACETAMINOPHEN 325 MG/1
325 TABLET ORAL EVERY 6 HOURS PRN
COMMUNITY

## 2017-10-16 RX ORDER — PROPRANOLOL HYDROCHLORIDE 20 MG/1
20 TABLET ORAL
Refills: 0 | COMMUNITY
Start: 2017-10-03 | End: 2017-10-16

## 2017-10-16 RX ORDER — PROPRANOLOL HYDROCHLORIDE 10 MG/1
10 TABLET ORAL 2 TIMES DAILY
COMMUNITY
End: 2018-05-18

## 2017-10-16 RX ORDER — CLONAZEPAM 0.5 MG/1
0.5 TABLET ORAL NIGHTLY PRN
Refills: 0 | COMMUNITY
Start: 2017-10-03

## 2017-10-16 NOTE — PROGRESS NOTES
Kell Morales is a 33year old male.   Patient presents with:  Cough      HPI:   Pt comes for f/u   C/c cough and chest swelling   C/o coughing up x one week cloudy/white and salty   Also has gi issues and will see dr Doris Gallo who he saw 2017 -- had some b 2013    overdosed on Lithium Summer of 2013, Hospitalized for 2 weeks in [place]   • Transaminitis       Past Surgical History:   Procedure Laterality Date   • Extraction erupted tooth/exr      wisdom teeth removed   • Orchiopexy        as an infant due to Reprinted endo referral for pt   tob dep- per pt he quit although he uses the vapors      this cough is chronic -- will check   Advised to take the propranolol 1 tablet twice a day every day as he is only taking it only prn --states his psychiatrist gave i

## 2017-10-16 NOTE — PATIENT INSTRUCTIONS
Eating Heart-Healthy Food: Using the [address] for your heart doesn’t have to be hard or boring. You just need to know how to make healthier choices. The DASH eating plan has been developed to help you do just that.  DASH stands for Dietary Appr · 1 egg  Best choices: Lean poultry and fish. Trim away visible fat. Broil, grill, roast, or boil instead of frying. Remove skin from poultry before eating.  Limit how much red meat you eat.  Nuts, seeds, beans  Servings: 4 to 5 a week  A serving is:  · One Choose foods from each of the food groups below each day. Try to get the recommended number of servings for each food group. The serving numbers are based on a diet of 2,000 calories a day. Talk to your doctor if you’re unsure about your calorie needs.  Melquiades Servings: 2 to 3 a day  A serving is:  · 1 teaspoon vegetable oil  · 1 teaspoon soft margarine  · 1 tablespoon mayonnaise  · 2 tablespoons salad dressing  Best choices: Nut and vegetable oils (nontropical vegetable oils), such as olive and canola oil.  Swee

## 2017-10-22 ENCOUNTER — HOSPITAL ENCOUNTER (OUTPATIENT)
Dept: GENERAL RADIOLOGY | Age: 27
Discharge: HOME | End: 2017-10-22
Attending: INTERNAL MEDICINE
Payer: COMMERCIAL

## 2017-10-22 DIAGNOSIS — R05.9 COUGHING: ICD-10-CM

## 2017-10-22 PROCEDURE — 71020 XR CHEST PA + LAT CHEST (CPT=71020): CPT | Performed by: INTERNAL MEDICINE

## 2017-12-06 NOTE — TELEPHONE ENCOUNTER
Pt states his father accidentally threw out his prep kit and prep kit instructions. Pt is requesting a new script for prep kit to be sent In to preferred pharmacy as well as prep kit instructions. CLN is scheduled for 12/22/17.  Please call thank you [phone]

## 2017-12-06 NOTE — TELEPHONE ENCOUNTER
I called [facility] and spoke to [place] re below--gave new Suprep rx--Kylie CARDENAS, could you please sign off? Then you may close this encounter. Thanks. Contacted pt and informed rx called in.  Instructed that I was mailing new instructions and confirmed

## 2017-12-22 ENCOUNTER — SURGERY (OUTPATIENT)
Age: 27
End: 2017-12-22

## 2017-12-22 ENCOUNTER — ANESTHESIA (OUTPATIENT)
Dept: ENDOSCOPY | Facility: HOSPITAL | Age: 27
End: 2017-12-22
Payer: COMMERCIAL

## 2017-12-22 ENCOUNTER — ANESTHESIA EVENT (OUTPATIENT)
Dept: ENDOSCOPY | Facility: HOSPITAL | Age: 27
End: 2017-12-22
Payer: COMMERCIAL

## 2017-12-22 ENCOUNTER — HOSPITAL ENCOUNTER (OUTPATIENT)
Facility: HOSPITAL | Age: 27
Setting detail: HOSPITAL OUTPATIENT SURGERY
Discharge: HOME | End: 2017-12-22
Attending: INTERNAL MEDICINE | Admitting: INTERNAL MEDICINE
Payer: COMMERCIAL

## 2017-12-22 DIAGNOSIS — Z86.010 HISTORY OF ADENOMATOUS POLYP OF COLON: ICD-10-CM

## 2017-12-22 DIAGNOSIS — R19.4 ALTERED BOWEL HABITS: ICD-10-CM

## 2017-12-22 PROCEDURE — 88305 TISSUE EXAM BY PATHOLOGIST: CPT | Performed by: INTERNAL MEDICINE

## 2017-12-22 PROCEDURE — 88341 IMHCHEM/IMCYTCHM EA ADD ANTB: CPT | Performed by: INTERNAL MEDICINE

## 2017-12-22 PROCEDURE — 0DBP8ZX EXCISION OF RECTUM, VIA NATURAL OR ARTIFICIAL OPENING ENDOSCOPIC, DIAGNOSTIC: ICD-10-PCS | Performed by: INTERNAL MEDICINE

## 2017-12-22 PROCEDURE — 88360 TUMOR IMMUNOHISTOCHEM/MANUAL: CPT | Performed by: INTERNAL MEDICINE

## 2017-12-22 PROCEDURE — 88342 IMHCHEM/IMCYTCHM 1ST ANTB: CPT | Performed by: INTERNAL MEDICINE

## 2017-12-22 RX ORDER — SODIUM CHLORIDE, SODIUM LACTATE, POTASSIUM CHLORIDE, CALCIUM CHLORIDE 600; 310; 30; 20 MG/100ML; MG/100ML; MG/100ML; MG/100ML
INJECTION, SOLUTION INTRAVENOUS CONTINUOUS
Status: DISCONTINUED | OUTPATIENT
Start: 2017-12-22 | End: 2017-12-22

## 2017-12-22 RX ORDER — SODIUM CHLORIDE, SODIUM LACTATE, POTASSIUM CHLORIDE, CALCIUM CHLORIDE 600; 310; 30; 20 MG/100ML; MG/100ML; MG/100ML; MG/100ML
INJECTION, SOLUTION INTRAVENOUS CONTINUOUS
Status: CANCELLED | OUTPATIENT
Start: 2017-12-22

## 2017-12-22 RX ORDER — NALOXONE HYDROCHLORIDE 0.4 MG/ML
80 INJECTION, SOLUTION INTRAMUSCULAR; INTRAVENOUS; SUBCUTANEOUS AS NEEDED
Status: CANCELLED | OUTPATIENT
Start: 2017-12-22 | End: 2017-12-22

## 2017-12-22 RX ADMIN — SODIUM CHLORIDE, SODIUM LACTATE, POTASSIUM CHLORIDE, CALCIUM CHLORIDE: 600; 310; 30; 20 INJECTION, SOLUTION INTRAVENOUS at 12:11:00

## 2017-12-22 RX ADMIN — SODIUM CHLORIDE, SODIUM LACTATE, POTASSIUM CHLORIDE, CALCIUM CHLORIDE: 600; 310; 30; 20 INJECTION, SOLUTION INTRAVENOUS at 11:38:00

## 2017-12-22 NOTE — OPERATIVE REPORT
[facility] - [facility] Endoscopy Report      Preoperative Diagnosis:  - history of colonic polyp      Postoperative Diagnosis:  - colon polyp x 1  - internal hemorrhoids      Procedure:    Colonoscopy         Surgeon:  Zara Dandy, M.D.     Anesthesia

## 2017-12-22 NOTE — ANESTHESIA POSTPROCEDURE EVALUATION
Patient: Garrick Gonzalez    Procedure Summary     Date:  12/22/17 Room / Location:  [address] [place] [address] ENDOSCOPY 01 / [address] [place] [address] ENDOSCOPY    Anesthesia Start:  6020 Anesthesia Stop:      Procedure:  COLONOSCOPY (N/A ) Diagnosis:       History of adenomatous polyp of colon

## 2017-12-22 NOTE — ANESTHESIA PREPROCEDURE EVALUATION
Anesthesia PreOp Note    HPI:     Fabiano Yang is a 33year old male who presents for preoperative consultation requested by: Eduardo Naqvi MD    Date of Surgery: 12/22/2017    Procedure(s):  COLONOSCOPY  Indication: History of adenomatous polyp o Sulfate-K Sulfate-Mg Sulf (SUPREP BOWEL PREP KIT) 17.5-3.13-1.6 GM/180ML Oral Solution Take as directed by mouth prior to colonoscopy.  Disp: 1 Bottle Rfl: 0 12/21/2017   amoxicillin 500 MG Oral Cap TAKE 1 C PO EVERY 8 HOURS TAT Disp:  Rfl: 0 12/21/2017   B Years     Types: Cigarettes    Quit date: 10/2/2017    Smokeless tobacco: Former User    Alcohol use Yes  1.2 oz/week    2 Cans of beer per week         Comment: only on holidays.     Drug use: Yes     Other Topics Concern   None on file     Social History

## 2017-12-22 NOTE — H&P
History & Physical Examination    Patient Name: Chelly Hayward  MRN: [id number]  [facility]: [id number]  YOB: 1990    Diagnosis: history of colon polyps        Prescriptions Prior to Admission:  Na Sulfate-K Sulfate-Mg Sulf (SUPREP BOWEL PREP KIT) Overdose 01/30/2016    haldol overdose/poisoning   • Pleurisy twice in 2013    left lung   • Pneumonia    • Pulmonary embolism (HCC)    • Schizo affective schizophrenia ([facility]    • Suicide and self-inflicted poisoning by drug or medicinal substance ([facility] del[place]

## 2017-12-23 ENCOUNTER — TELEPHONE (OUTPATIENT)
Dept: GASTROENTEROLOGY | Facility: CLINIC | Age: 27
End: 2017-12-23

## 2017-12-23 NOTE — TELEPHONE ENCOUNTER
Called by Dr. Joe Villaseñor (path) that polyp removed from rectum was carcinoid.     Anay Jordan MD  [facility] - Gastroenterology  12/23/2017  10:08 AM

## 2017-12-26 VITALS
OXYGEN SATURATION: 100 % | DIASTOLIC BLOOD PRESSURE: 80 MMHG | RESPIRATION RATE: 13 BRPM | HEIGHT: 71 IN | BODY MASS INDEX: 44.1 KG/M2 | WEIGHT: 315 LBS | SYSTOLIC BLOOD PRESSURE: 119 MMHG | HEART RATE: 93 BPM

## 2017-12-29 ENCOUNTER — TELEPHONE (OUTPATIENT)
Dept: GASTROENTEROLOGY | Facility: CLINIC | Age: 27
End: 2017-12-29

## 2017-12-29 NOTE — TELEPHONE ENCOUNTER
Message left on voicemail for the patient to call to review colon polyp results. 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 This was small about 3 mm in size.     As this was likely completely excised I would advise repeat flexible sigmoidoscopy i

## 2018-01-02 NOTE — TELEPHONE ENCOUNTER
I left a message on the patient's voicemail (okay per HIPAA) and relayed Dr. Oren Bradley results. Patient may call back with any questions.

## 2018-01-25 ENCOUNTER — OFFICE VISIT (OUTPATIENT)
Dept: ENDOCRINOLOGY CLINIC | Facility: CLINIC | Age: 28
End: 2018-01-25

## 2018-01-25 VITALS
DIASTOLIC BLOOD PRESSURE: 82 MMHG | WEIGHT: 315 LBS | SYSTOLIC BLOOD PRESSURE: 122 MMHG | HEART RATE: 90 BPM | HEIGHT: 71 IN | BODY MASS INDEX: 44.1 KG/M2

## 2018-01-25 DIAGNOSIS — E24.9 CUSHING'S SYNDROME (HCC): Primary | ICD-10-CM

## 2018-01-25 PROCEDURE — 99212 OFFICE O/P EST SF 10 MIN: CPT | Performed by: INTERNAL MEDICINE

## 2018-01-25 PROCEDURE — 99244 OFF/OP CNSLTJ NEW/EST MOD 40: CPT | Performed by: INTERNAL MEDICINE

## 2018-01-25 RX ORDER — DEXAMETHASONE 1 MG
1 TABLET ORAL ONCE
Qty: 1 TABLET | Refills: 0 | Status: SHIPPED | OUTPATIENT
Start: 2018-01-25 | End: 2018-01-25

## 2018-01-25 NOTE — H&P
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 Brandy Jaramillo MD    CHIEF COMPLAINT:  Patient presents with: Other: Suspected cushing syndrom.  Has not felt well for 1 yea mg total) by mouth one time. Disp: 1 tablet Rfl: 0   BusPIRone HCl 10 MG Oral Tab TK 1 T PO BID Disp:  Rfl: 0   ClonazePAM 0.5 MG Oral Tab 0.5 mg nightly as needed.    Disp:  Rfl: 0   acetaminophen 325 MG Oral Tab Take 325 mg by mouth every 6 (six) hours as Leukemia   • Hypertension Father        ASSESSMENTS:       REVIEW OF SYSTEMS:  Constitutional: Negative for:  fever, +  fatigue, cold/heat intolerance, + weight gain  Eyes: Negative for:  Visual changes, proptosis, blurring  ENT: Negative for:  dysphag production and secretion  Discussed cushing syndrome in detail including pathogenesis, clinical manifestations and management.   Discussed that the first step will be to establish hypercortisolism  Discussed the utility of DST, urine cortisol and salivary c

## 2018-01-26 RX ORDER — NIFEDIPINE 60 MG/1
TABLET, FILM COATED, EXTENDED RELEASE ORAL
Qty: 90 TABLET | Refills: 0 | Status: SHIPPED
Start: 2018-01-26 | End: 2018-05-18

## 2018-01-26 NOTE — TELEPHONE ENCOUNTER
From: Vivi Hall  Sent: 1/25/2018 1:18 PM CST  Subject: Medication Renewal Request    Reenarachelcas Gandhi.  Harjinder Gonzalez would like a refill of the following medications:     NIFEdipine ER 60 MG Oral Tablet 24 Hr Tomás Santos MD]    Preferred pharmacy: [place] [address] [facility] [place], [place] - [address], [phone], [phone]

## 2018-01-28 ENCOUNTER — APPOINTMENT (OUTPATIENT)
Dept: LAB | Facility: HOSPITAL | Age: 28
End: 2018-01-28
Attending: INTERNAL MEDICINE
Payer: COMMERCIAL

## 2018-01-28 DIAGNOSIS — E24.9 CUSHING'S SYNDROME (HCC): ICD-10-CM

## 2018-01-28 LAB
ANION GAP SERPL CALC-SCNC: 10 MMOL/L (ref 0–18)
BUN SERPL-MCNC: 8 MG/DL (ref 8–20)
BUN/CREAT SERPL: 9.8 (ref 10–20)
CALCIUM SERPL-MCNC: 9.6 MG/DL (ref 8.5–10.5)
CHLORIDE SERPL-SCNC: 100 MMOL/L (ref 95–110)
CO2 SERPL-SCNC: 28 MMOL/L (ref 22–32)
CORTIS SERPL-MCNC: 8.3 MCG/DL
CREAT 24H UR-MRATE: 2.4 G/24HR (ref 0.6–2)
CREAT SERPL-MCNC: 0.82 MG/DL (ref 0.5–1.5)
GLUCOSE SERPL-MCNC: 88 MG/DL (ref 70–99)
OSMOLALITY UR CALC.SUM OF ELEC: 284 MOSM/KG (ref 275–295)
POTASSIUM SERPL-SCNC: 3.8 MMOL/L (ref 3.3–5.1)
SODIUM SERPL-SCNC: 138 MMOL/L (ref 136–144)
SPECIMEN VOL 24H UR: 1960 ML/24H

## 2018-01-28 PROCEDURE — 82530 CORTISOL FREE: CPT

## 2018-01-28 PROCEDURE — 82570 ASSAY OF URINE CREATININE: CPT

## 2018-01-28 PROCEDURE — 82533 TOTAL CORTISOL: CPT

## 2018-01-28 PROCEDURE — 82024 ASSAY OF ACTH: CPT

## 2018-01-28 PROCEDURE — 36415 COLL VENOUS BLD VENIPUNCTURE: CPT

## 2018-01-28 PROCEDURE — 80048 BASIC METABOLIC PNL TOTAL CA: CPT

## 2018-01-30 ENCOUNTER — TELEPHONE (OUTPATIENT)
Dept: ENDOCRINOLOGY CLINIC | Facility: CLINIC | Age: 28
End: 2018-01-30

## 2018-01-30 ENCOUNTER — APPOINTMENT (OUTPATIENT)
Dept: LAB | Facility: HOSPITAL | Age: 28
End: 2018-01-30
Attending: INTERNAL MEDICINE
Payer: COMMERCIAL

## 2018-01-30 DIAGNOSIS — R94.7 ABNORMAL DEXAMETHASONE SUPPRESSION TEST: Primary | ICD-10-CM

## 2018-01-30 DIAGNOSIS — R94.7 ABNORMAL DEXAMETHASONE SUPPRESSION TEST: ICD-10-CM

## 2018-01-30 LAB — CORTIS SERPL-MCNC: 0.5 MCG/DL

## 2018-01-30 PROCEDURE — 36415 COLL VENOUS BLD VENIPUNCTURE: CPT

## 2018-01-30 PROCEDURE — 82533 TOTAL CORTISOL: CPT

## 2018-01-30 NOTE — TELEPHONE ENCOUNTER
Asking for new Order for cortisol to be put into in epic   The current order was accidentally used in Sunday

## 2018-01-31 LAB
CORTISOL, SALIVA: 0.03 UG/DL
CORTISOL, UR FREE-RATIO TO CRT: 9.91 UG/G CRT
CORTISOL, URINE FREE - PER 24H: 22.3 UG/D
CORTISOL, URINE FREE - PER VOL: 11.4 UG/L
CREATININE, URINE - PER 24H: 2254 MG/D
CREATININE, URINE - PER VOLUME: 115 MG/DL
HOURS COLLECTED: 24 HR
TOTAL VOLUME: 1960 ML

## 2018-02-02 LAB — ADRENOCORTICOTROPIC HORMONE: 17 PG/ML

## 2018-02-07 ENCOUNTER — TELEPHONE (OUTPATIENT)
Dept: ENDOCRINOLOGY CLINIC | Facility: CLINIC | Age: 28
End: 2018-02-07

## 2018-02-14 ENCOUNTER — TELEPHONE (OUTPATIENT)
Dept: ENDOCRINOLOGY CLINIC | Facility: CLINIC | Age: 28
End: 2018-02-14

## 2018-02-14 ENCOUNTER — PATIENT MESSAGE (OUTPATIENT)
Dept: ENDOCRINOLOGY CLINIC | Facility: CLINIC | Age: 28
End: 2018-02-14

## 2018-02-14 DIAGNOSIS — E24.9 CUSHING SYNDROME (HCC): Primary | ICD-10-CM

## 2018-02-14 NOTE — TELEPHONE ENCOUNTER
We are working up the patient for cushings  I have all his results now  DST was normal  Urinary collection did not show high cortisol  His salivary cortisol was on the higher side  I recommend repeating it. I will call him with results. Thanks.

## 2018-02-14 NOTE — TELEPHONE ENCOUNTER
Called Sp[facility]. Informed him of Dr. Smith Sit message below. He will repeat salivary cortisol test. Ordered. Patient states he saw his results on MyChart and thought he saw that his urine creatine was elevated. Should he be concerned?  Per patient he

## 2018-02-26 ENCOUNTER — APPOINTMENT (OUTPATIENT)
Dept: LAB | Facility: HOSPITAL | Age: 28
End: 2018-02-26
Attending: INTERNAL MEDICINE
Payer: COMMERCIAL

## 2018-02-26 DIAGNOSIS — E24.9 CUSHING SYNDROME (HCC): ICD-10-CM

## 2018-02-26 PROCEDURE — 82533 TOTAL CORTISOL: CPT

## 2018-02-28 LAB — CORTISOL, SALIVA: <0.012 UG/DL

## 2018-03-11 ENCOUNTER — PATIENT MESSAGE (OUTPATIENT)
Dept: ENDOCRINOLOGY CLINIC | Facility: CLINIC | Age: 28
End: 2018-03-11

## 2018-04-23 ENCOUNTER — TELEPHONE (OUTPATIENT)
Dept: GASTROENTEROLOGY | Facility: CLINIC | Age: 28
End: 2018-04-23

## 2018-05-18 ENCOUNTER — OFFICE VISIT (OUTPATIENT)
Dept: INTERNAL MEDICINE CLINIC | Facility: CLINIC | Age: 28
End: 2018-05-18

## 2018-05-18 VITALS
HEART RATE: 92 BPM | WEIGHT: 315 LBS | DIASTOLIC BLOOD PRESSURE: 84 MMHG | HEIGHT: 71 IN | BODY MASS INDEX: 44.1 KG/M2 | SYSTOLIC BLOOD PRESSURE: 137 MMHG

## 2018-05-18 DIAGNOSIS — G47.30 SLEEP-DISORDERED BREATHING: ICD-10-CM

## 2018-05-18 DIAGNOSIS — D3A.00 CARCINOID (EXCEPT OF APPENDIX): ICD-10-CM

## 2018-05-18 DIAGNOSIS — F17.200 TOBACCO DEPENDENCE: ICD-10-CM

## 2018-05-18 DIAGNOSIS — G25.81 RESTLESS LEG SYNDROME: ICD-10-CM

## 2018-05-18 DIAGNOSIS — I10 ESSENTIAL HYPERTENSION: Primary | ICD-10-CM

## 2018-05-18 PROCEDURE — 99214 OFFICE O/P EST MOD 30 MIN: CPT | Performed by: INTERNAL MEDICINE

## 2018-05-18 PROCEDURE — 99212 OFFICE O/P EST SF 10 MIN: CPT | Performed by: INTERNAL MEDICINE

## 2018-05-18 RX ORDER — PROPRANOLOL HYDROCHLORIDE 20 MG/1
20 TABLET ORAL AS NEEDED
COMMUNITY

## 2018-05-18 RX ORDER — NIFEDIPINE 60 MG/1
TABLET, FILM COATED, EXTENDED RELEASE ORAL
Qty: 90 TABLET | Refills: 3 | Status: SHIPPED | OUTPATIENT
Start: 2018-05-18

## 2018-05-18 NOTE — PROGRESS NOTES
Terry Monet is a 33year old male.   Patient presents with:  HTN  Fatigue: has not been feeling well      HPI:   Pt comes for f/u   c/c htn and fatigue   c/o fatigue x a few mns and not sure if its really fatigue but he has insomnia and rls and now th hyperactivity disorder)    • Anxiety state, unspecified     severre anxiety   • Bipolar disorder ([facility] [address]    • DVT (deep venous thrombosis) (HCC)    • Esophageal reflux    • High blood pressure    • Hypertension    • Hypokalemia    • Overdose 01/30/2016    ha BMI 55.51 kg/m²   GENERAL: well developed, well nourished,in no apparent distress  SKIN: no rashes,no suspicious lesions  HEENT: atraumatic, normocephalic  LUNGS: clear to auscultation, no wheeze  CARDIO: RRR without murmur  GI: Obese  EXTREMITIES: no kenny

## 2019-10-22 ENCOUNTER — TELEPHONE (OUTPATIENT)
Dept: GASTROENTEROLOGY | Facility: CLINIC | Age: 29
End: 2019-10-22

## (undated) DEVICE — SNARE OPTMZ PLPCTM TRP

## (undated) DEVICE — ENDOSCOPY PACK - LOWER: Brand: MEDLINE INDUSTRIES, INC.

## (undated) DEVICE — FORCEP RADIAL JAW 4

## (undated) DEVICE — Device: Brand: DEFENDO AIR/WATER/SUCTION AND BIOPSY VALVE

## (undated) NOTE — MR AVS SNAPSHOT
[facility]  [address], [address] [address]  [address] [phone]               Thank you for choosing us for your health care visit with Amaury Allred MD, MD.  We are glad to serve you and happy to provide you with this summary o Dysphagia, unspecified type    -  Primary      Instructions and Information about Your Health     None      Allergies as of Mar 03, 2017     Paroxetine     Other reaction(s): Other (See Comments)  Bloody stool    Lactose Pain    Other reaction(s):  Other Visit [facility] online at  [facility].tn

## (undated) NOTE — LETTER
08/10/17        Frances Bone  [address] [facility] [facility] [address]      Dear Alejandro Pack records indicate that you have outstanding lab work and or testing that was ordered for you and has not yet been completed:     To provide you with the best

## (undated) NOTE — LETTER
ADULT VIDEOFLUOROSCOPIC SWALLOWING STUDY    Referring Physician: Dr. Jazzy Yoo    1.  Dysphagia, unspecified type R13.10 XR VIDEO SWALLOW (CPT=74230)     XR VIDEO SWALLOW (WAS=53266)      Date of Service: 3/30/2017   Radiologist: Dr. Jeremias Jurado • Bipolar disorder ([facility] [address]    • DVT (deep venous thrombosis) (HCC)    • Transaminitis    • Hypertension    • Pneumonia    • Hypokalemia        Imaging results: CXR 11/7/16:  Negative chest results.     ASSESSMENT   DYSPHAGIA ASSESSMENT  Test completed in conj dry multiple swallow.     Esophageal phase:   Adequate flow of bolus through upper esophagus     Overall Impression: Pt presents with adequate oral phase of swallow and minimal oral dysphagia secondary to delayed transit times on thin liquids causing flash Electronically signed by therapist: KING Cao    Patient Name: Shantel Grant  YOB: 1990          MRN number:  [id number]  Date:  3/30/2017  Referring Physician: Elizabeth Blanchard

## (undated) NOTE — Clinical Note
May 23, 2017         Page Wagner MD  [address] [address]      Patient: Deo Fatima   YOB: 1990   Date of Visit: 5/22/2017       Dear Gunner Leggett:           As you know, Marck Dinh is a 21-year-old male who I am meeti ALLERGIES TO MEDICATIONS: Fluoxetine Lamictal lithium lactose    MEDICATIONS: Ziprasidone buspirone zolpidem acetaminophen    REVIEW OF SYSTEMS: Vision normal. Ear nose and throat normal. Bowel normal. Bladder function normal. No depression.  No thyroid dis me promptly if new problem in the interim. I am delighted to assist in Spenser's care.             With warmest regards,     Ta Arciniega MD   [facility]  [address] [facility], [address]

## (undated) NOTE — LETTER
10/22/2019    Kathy Bartlett        [address]        [facility] [address]            Dear Kathy Bartlett,      Our records indicate that you are due for an appointment for a Colonoscopy in December 2019, or shortly there after, with Clem Bailon

## (undated) NOTE — LETTER
4/23/2018    Sharlene Holm        [address]        [facility]            Dear Sharlene Holm,      Our records indicate that you are due for an appointment for a Flexible Sigmoidoscopy on or about June 2018 with Rizwana Caballero MD.

## (undated) NOTE — Clinical Note
Thank you for the consult. I saw Ms. Farzaneh Mecrhant in the endocrine/diabetes clinic today. Please see attached my note. Please feel free to contact me with any questions. Thanks!

## (undated) NOTE — MR AVS SNAPSHOT
[facility]  [address] [place] [address] [phone]               Thank you for choosing us for your health care visit with Daniele Santos MD.  We are glad to serve you and happy to provide you with this summary of your visit. BusPIRone HCl 10 MG Tabs   Take 10 mg by mouth 2 (two) times daily. Commonly known as:  BUSPAR           Ziprasidone HCl 80 MG Caps   Take 80 mg by mouth 2 (two) times daily.  80 mg qam and 160 mg qhs   Commonly known as:  GEODON           Zolpidem Esteban Dapper Start activities slowly and build up over time Do what you like   Get your heart pumping – brisk walking, biking, swimming Even 10 minute increments are effective and add up over the week   2 ½ hours per week – spread out over time Use a vidal to keep you

## (undated) NOTE — Clinical Note
[address] [facility]  [address], [address] [place] [address]  Lisseth Juniordavid [phone]        Dear Jaclyn Carey MD,      I had the pleasure of seeing your patient, Melania Vicente on 3/3/2017.      Below please find a Zolpidem Tartrate 10 MG Oral Tab TK 1 T PO QHS Disp:  Rfl: 0   acetaminophen 500 MG Oral Tab Take 500 mg by mouth every 6 (six) hours as needed for Pain. Taking 2 tabs 1-2 times at night.  Disp:  Rfl:    Ziprasidone HCl 80 MG Oral Cap Take 80 mg by mouth 2 Comment: Marijuana, average once every other                 month.         ROS:   GENERAL HEALTH: Insomnia  SKIN: denies any unusual skin lesions or rashes  EYES: no visual complaints or deficits  HEENT: Difficulty swallowing  RESPIRATORY: Short of br Coordination: Finger to nose, heel to shin intact bilaterally. Gait: Narrow based, negative Romberg’s sign. Can stand on heels and toes. He can rise from a squatting position.     ASSESSMENT AND PLAN:     Deo Fatima 23year old male presents to

## (undated) NOTE — Clinical Note
Chelsea Herrera, [address]       05/22/2017        Patient: Tato Costa   YOB: 1990   Date of Visit: 5/22/2017       Dear Jorge Spivey:           As you know, Dylon Lopez is a 27-year-old male who I am meetin ALLERGIES TO MEDICATIONS: Fluoxetine Lamictal lithium lactose    MEDICATIONS: Ziprasidone buspirone zolpidem acetaminophen    REVIEW OF SYSTEMS: Vision normal. Ear nose and throat normal. Bowel normal. Bladder function normal. No depression.  No thyroid dis me promptly if new problem in the interim. I am delighted to assist in Spenser's care.             With warmest regards,           Eunice Merchant MD   [facility]  [address] [address] [address]

## (undated) NOTE — MR AVS SNAPSHOT
[facility] [place] [address] [address] [id number] [phone]               Thank you for choosing us for your health care visit with Landy Homans, MD.  We are glad to serve you and happy to provide you with this summary o taking this medication, and follow the directions you see here. Ziprasidone HCl 80 MG Caps   Take 80 mg by mouth 2 (two) times daily.  80 mg qam and 160 mg qhs   Commonly known as:  GEODON                   Today's Orders     Neuro Referral - NILSON schedule your appointment.        [facility]  [address]   [facility] [address] [address] [facility], [address], [place]  [id number]     [facility] If you are confident that your benefit plan will not require a referral or authorization, such as [place] Medicaid, please feel free to schedule your appointment immediately.  However, if you are unsure about the requirements for authorization, please wait Aerobic physical activity Regular aerobic physical activity (e.g., brisk walking, light jogging, cycling, swimming, etc.) for a goal of at least 150 minutes per week. Moderation of alcohol consumption Men: limit to <= 2 drinks* per day.   Women and lighte